# Patient Record
Sex: MALE | Race: BLACK OR AFRICAN AMERICAN
[De-identification: names, ages, dates, MRNs, and addresses within clinical notes are randomized per-mention and may not be internally consistent; named-entity substitution may affect disease eponyms.]

---

## 2017-01-16 ENCOUNTER — HOSPITAL ENCOUNTER (OUTPATIENT)
Dept: HOSPITAL 5 - LAB | Age: 82
Discharge: HOME | End: 2017-01-16
Attending: INTERNAL MEDICINE
Payer: MEDICARE

## 2017-01-16 DIAGNOSIS — N18.3: Primary | ICD-10-CM

## 2017-01-16 LAB
ANION GAP SERPL CALC-SCNC: 18 MMOL/L
BACTERIA #/AREA URNS HPF: (no result) /HPF
BILIRUB UR QL STRIP: (no result)
BLOOD UR QL VISUAL: (no result)
BUN SERPL-MCNC: 30 MG/DL (ref 9–20)
BUN/CREAT SERPL: 20 %
CALCIUM SERPL-MCNC: 9.1 MG/DL (ref 8.4–10.2)
CHLORIDE SERPL-SCNC: 104.9 MMOL/L (ref 98–107)
CO2 SERPL-SCNC: 22 MMOL/L (ref 22–30)
GLUCOSE SERPL-MCNC: 124 MG/DL (ref 75–100)
HCT VFR BLD CALC: 34.1 % (ref 35.5–45.6)
HGB BLD-MCNC: 11.6 GM/DL (ref 11.8–15.2)
KETONES UR STRIP-MCNC: (no result) MG/DL
LEUKOCYTE ESTERASE UR QL STRIP: (no result)
MAGNESIUM SERPL-MCNC: 1.8 MG/DL (ref 1.7–2.3)
MCH RBC QN AUTO: 31 PG (ref 28–32)
MCHC RBC AUTO-ENTMCNC: 34 % (ref 32–34)
MCV RBC AUTO: 92 FL (ref 84–94)
MUCOUS THREADS #/AREA URNS HPF: (no result) /HPF
NITRITE UR QL STRIP: (no result)
PH UR STRIP: 5 [PH] (ref 5–7)
PHOSPHATE SERPL-MCNC: 3.8 MG/DL (ref 2.5–4.5)
PLATELET # BLD: 223 K/MM3 (ref 140–440)
POTASSIUM SERPL-SCNC: 4.2 MMOL/L (ref 3.6–5)
PROT UR STRIP-MCNC: >500 MG/DL
RBC # BLD AUTO: 3.73 M/MM3 (ref 3.65–5.03)
RBC #/AREA URNS HPF: 1 /HPF (ref 0–6)
SODIUM SERPL-SCNC: 141 MMOL/L (ref 137–145)
UROBILINOGEN UR-MCNC: < 2 MG/DL (ref ?–2)
WBC # BLD AUTO: 4.9 K/MM3 (ref 4.5–11)
WBC #/AREA URNS HPF: 4 /HPF (ref 0–6)

## 2017-01-16 PROCEDURE — 81001 URINALYSIS AUTO W/SCOPE: CPT

## 2017-01-16 PROCEDURE — 36415 COLL VENOUS BLD VENIPUNCTURE: CPT

## 2017-01-16 PROCEDURE — 83970 ASSAY OF PARATHORMONE: CPT

## 2017-01-16 PROCEDURE — 80048 BASIC METABOLIC PNL TOTAL CA: CPT

## 2017-01-16 PROCEDURE — 83735 ASSAY OF MAGNESIUM: CPT

## 2017-01-16 PROCEDURE — 85027 COMPLETE CBC AUTOMATED: CPT

## 2017-01-16 PROCEDURE — 84100 ASSAY OF PHOSPHORUS: CPT

## 2017-01-16 PROCEDURE — 80074 ACUTE HEPATITIS PANEL: CPT

## 2017-01-16 PROCEDURE — 84165 PROTEIN E-PHORESIS SERUM: CPT

## 2017-01-17 LAB
ALBUMIN SERPL-MCNC: 4 G/DL (ref 3.8–4.8)
GAMMA GLOB SERPL ELPH-MCNC: 0.5 G/DL (ref 0.8–1.7)
IMP & REVIEW OF LAB RESULTS: (no result)

## 2017-03-08 ENCOUNTER — RX ONLY (OUTPATIENT)
Age: 82
Setting detail: RX ONLY
End: 2017-03-08

## 2017-05-10 ENCOUNTER — HOSPITAL ENCOUNTER (OUTPATIENT)
Dept: HOSPITAL 5 - LAB | Age: 82
Discharge: HOME | End: 2017-05-10
Attending: INTERNAL MEDICINE
Payer: MEDICARE

## 2017-05-10 DIAGNOSIS — N18.3: Primary | ICD-10-CM

## 2017-05-10 LAB
ANION GAP SERPL CALC-SCNC: 17 MMOL/L
BILIRUB UR QL STRIP: (no result)
BLOOD UR QL VISUAL: (no result)
BUN SERPL-MCNC: 29 MG/DL (ref 9–20)
BUN/CREAT SERPL: 19.33 %
CALCIUM SERPL-MCNC: 9 MG/DL (ref 8.4–10.2)
CHLORIDE SERPL-SCNC: 105.6 MMOL/L (ref 98–107)
CO2 SERPL-SCNC: 23 MMOL/L (ref 22–30)
GLUCOSE SERPL-MCNC: 98 MG/DL (ref 75–100)
KETONES UR STRIP-MCNC: (no result) MG/DL
LEUKOCYTE ESTERASE UR QL STRIP: (no result)
MUCOUS THREADS #/AREA URNS HPF: (no result) /HPF
NITRITE UR QL STRIP: (no result)
PH UR STRIP: 5 [PH] (ref 5–7)
POTASSIUM SERPL-SCNC: 4.4 MMOL/L (ref 3.6–5)
RBC #/AREA URNS HPF: 1 /HPF (ref 0–6)
SODIUM SERPL-SCNC: 141 MMOL/L (ref 137–145)
UROBILINOGEN UR-MCNC: < 2 MG/DL (ref ?–2)
WBC #/AREA URNS HPF: 2 /HPF (ref 0–6)

## 2017-05-10 PROCEDURE — 83036 HEMOGLOBIN GLYCOSYLATED A1C: CPT

## 2017-05-10 PROCEDURE — 82043 UR ALBUMIN QUANTITATIVE: CPT

## 2017-05-10 PROCEDURE — 36415 COLL VENOUS BLD VENIPUNCTURE: CPT

## 2017-05-10 PROCEDURE — 80048 BASIC METABOLIC PNL TOTAL CA: CPT

## 2017-05-10 PROCEDURE — 81001 URINALYSIS AUTO W/SCOPE: CPT

## 2018-03-06 ENCOUNTER — HOSPITAL ENCOUNTER (OUTPATIENT)
Dept: HOSPITAL 5 - LAB | Age: 83
Discharge: HOME | End: 2018-03-06
Attending: INTERNAL MEDICINE
Payer: MEDICARE

## 2018-03-06 DIAGNOSIS — N18.3: Primary | ICD-10-CM

## 2018-03-06 LAB
BUN SERPL-MCNC: 34 MG/DL (ref 9–20)
BUN/CREAT SERPL: 18 %
CALCIUM SERPL-MCNC: 9 MG/DL (ref 8.4–10.2)
HEMOLYSIS INDEX: 2

## 2018-03-06 PROCEDURE — 36415 COLL VENOUS BLD VENIPUNCTURE: CPT

## 2018-03-06 PROCEDURE — 80048 BASIC METABOLIC PNL TOTAL CA: CPT

## 2018-03-26 ENCOUNTER — HOSPITAL ENCOUNTER (OUTPATIENT)
Dept: HOSPITAL 5 - LAB | Age: 83
Discharge: HOME | End: 2018-03-26
Attending: INTERNAL MEDICINE
Payer: MEDICARE

## 2018-03-26 DIAGNOSIS — N18.3: Primary | ICD-10-CM

## 2018-03-26 DIAGNOSIS — Z79.899: ICD-10-CM

## 2018-03-26 LAB
BILIRUB UR QL STRIP: (no result)
BLOOD UR QL VISUAL: (no result)
BUN SERPL-MCNC: 29 MG/DL (ref 9–20)
BUN/CREAT SERPL: 19 %
CALCIUM SERPL-MCNC: 9 MG/DL (ref 8.4–10.2)
CREATININE,URINE: 111.6 MG/DL (ref 0.1–20)
HEMOLYSIS INDEX: 4
PH UR STRIP: 5 [PH] (ref 5–7)
PROT UR STRIP-MCNC: >500 MG/DL
RBC #/AREA URNS HPF: 3 /HPF (ref 0–6)
UROBILINOGEN UR-MCNC: < 2 MG/DL (ref ?–2)
WBC #/AREA URNS HPF: 1 /HPF (ref 0–6)

## 2018-03-26 PROCEDURE — 36415 COLL VENOUS BLD VENIPUNCTURE: CPT

## 2018-03-26 PROCEDURE — 82043 UR ALBUMIN QUANTITATIVE: CPT

## 2018-03-26 PROCEDURE — 81001 URINALYSIS AUTO W/SCOPE: CPT

## 2018-03-26 PROCEDURE — 80048 BASIC METABOLIC PNL TOTAL CA: CPT

## 2018-03-26 PROCEDURE — 83036 HEMOGLOBIN GLYCOSYLATED A1C: CPT

## 2018-08-16 NOTE — CAT SCAN REPORT
CT ABDOMEN PELVIS WITHOUT CONTRAST:



HISTORY:  Neoplasm of uncertain behavior, lymphoma intrapelvic.



COMPARISON: PET/CT dated 8/13/15.



TECHNIQUE:  Helical CT in 1.25mm intervals without IV contrast. 

Sagittal and coronal reconstructions. 





FINDINGS:



Liver: Normal.



Biliary system: Multiple small calcified gallstones are identified. No 

biliary dilatation or inflammation.



Pancreas: Normal.



Spleen: Normal.



Kidneys/ureters/bladder: Multiple bilateral large renal cysts are 

identified which appear stable in size and number. The largest cyst 

measures 11.4 cm at the superior pole of the left kidney. A 3 mm 

calyceal stone is identified in the superior right kidney. No ureteral 

stones or hydronephrosis. The bladder is partially empty. A 1 cm 

diverticulum is noted along the right lateral wall. The prostate gland 

appears enlarged with a prominent bulge on the bladder base which is 

unchanged.



Adrenal glands: Normal.



Aorta: Moderate diffuse calcifications. No aneurysm.



Intestines: The gastric wall appears moderately thickened although 

there is no oral contrast present in the stomach. This finding is 

unchanged since the previous PET/CT. There are numerous diverticula in 

the distal colon. Otherwise, the bowel loops are within normal limits. 

No evidence for large mass, inflammation or obstruction.



Appendix: Not confidently identified, correlate with surgical history.



Pelvic viscera: Normal.



Ascites: None.



Adenopathy: No pathologic adenopathy is identified in the abdomen or 

pelvis.



Musculoskeletal: Moderate lumbar spondylosis. No fracture or suspicious 

bony lesion.





IMPRESSION:

No evidence for disease metastasis or recurrence. No significant change 

since the PET/CT in 2015.

## 2020-11-10 ENCOUNTER — OUT OF OFFICE VISIT (OUTPATIENT)
Dept: URBAN - METROPOLITAN AREA MEDICAL CENTER 16 | Facility: MEDICAL CENTER | Age: 85
End: 2020-11-10
Payer: MEDICARE

## 2020-11-10 DIAGNOSIS — D53.8 OTHER SPECIFIED NUTRITIONAL ANEMIAS: ICD-10-CM

## 2020-11-10 DIAGNOSIS — N18.6 ANEMIA DUE TO CHRONIC KIDNEY DISEASE, ON CHRONIC DIALYSIS: ICD-10-CM

## 2020-11-10 DIAGNOSIS — K21.9 ACID REFLUX: ICD-10-CM

## 2020-11-10 DIAGNOSIS — K92.0 BLOODY EMESIS: ICD-10-CM

## 2020-11-10 DIAGNOSIS — Z99.2 DEPENDENCE ON RENAL DIALYSIS: ICD-10-CM

## 2020-11-10 DIAGNOSIS — K31.89 ACQUIRED DEFORMITY OF DUODENUM: ICD-10-CM

## 2020-11-10 PROCEDURE — G8427 DOCREV CUR MEDS BY ELIG CLIN: HCPCS | Performed by: INTERNAL MEDICINE

## 2020-11-10 PROCEDURE — 99232 SBSQ HOSP IP/OBS MODERATE 35: CPT | Performed by: INTERNAL MEDICINE

## 2020-11-10 PROCEDURE — 43235 EGD DIAGNOSTIC BRUSH WASH: CPT | Performed by: INTERNAL MEDICINE

## 2020-11-10 PROCEDURE — 99222 1ST HOSP IP/OBS MODERATE 55: CPT | Performed by: INTERNAL MEDICINE
